# Patient Record
Sex: MALE | Race: BLACK OR AFRICAN AMERICAN | NOT HISPANIC OR LATINO | RURAL
[De-identification: names, ages, dates, MRNs, and addresses within clinical notes are randomized per-mention and may not be internally consistent; named-entity substitution may affect disease eponyms.]

---
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
## 2024-11-08 NOTE — ED PROVIDER NOTES
Encounter Date: 6/29/2023       History     Chief Complaint   Patient presents with    Nasal Congestion     Pt c/o nasal congestion for weeks, states he works at a [employer] and has had to swallow his mucus.     18-year-old male presents to the emergency department to be evaluated for nasal congestion that began a couple of weeks ago.  He works in a cold environment, began doing this work about a month ago and thinks that is the cause of his nasal congestion.  Denies any fever, chills, known sick contacts.    The history is provided by the patient.   URI  Primary symptoms do not include fever, fatigue, headaches, ear pain, sore throat, swollen glands, cough, wheezing, abdominal pain, nausea, vomiting, myalgias, arthralgias or rash.   Symptoms associated with the illness include congestion and rhinorrhea.   Review of patient's allergies indicates:  No Known Allergies  History reviewed. No pertinent past medical history.  History reviewed. No pertinent surgical history.  History reviewed. No pertinent family history.     Review of Systems   Constitutional:  Negative for fatigue and fever.   HENT:  Positive for congestion and rhinorrhea. Negative for ear pain and sore throat.    Respiratory:  Negative for cough and wheezing.    Gastrointestinal:  Negative for abdominal pain, nausea and vomiting.   Musculoskeletal:  Negative for arthralgias and myalgias.   Skin:  Negative for rash.   Neurological:  Negative for headaches.   All other systems reviewed and are negative.    Physical Exam     Initial Vitals [06/29/23 1150]   BP Pulse Resp Temp SpO2   116/70 (!) 56 16 98.7 °F (37.1 °C) 98 %      MAP       --         Physical Exam    Vitals reviewed.  Constitutional: He appears well-developed and well-nourished.   Neck: Neck supple.   Cardiovascular:  Normal rate and regular rhythm.           Pulmonary/Chest: Breath sounds normal.   Abdominal: Abdomen is soft. Bowel sounds are normal. He exhibits no distension and no  mass. There is no abdominal tenderness. There is no rebound and no guarding.   Musculoskeletal:         General: Normal range of motion.      Cervical back: Neck supple.     Neurological: He is alert and oriented to person, place, and time. He has normal strength. GCS score is 15. GCS eye subscore is 4. GCS verbal subscore is 5. GCS motor subscore is 6.   Skin: Skin is warm and dry. Capillary refill takes less than 2 seconds.   Psychiatric: He has a normal mood and affect.       Medical Screening Exam   See Full Note    ED Course   Procedures  Labs Reviewed - No data to display       Imaging Results    None          Medications - No data to display  Medical Decision Makin-year-old male presents to the emergency department to be evaluated for nasal congestion that began a couple of weeks ago.  He works in a cold environment, began doing this work about a month ago and thinks that is the cause of his nasal congestion.  Denies any fever, chills, known sick contacts.  Diagnosis: Allergic rhinitis  Prescribed Flonase and Claritin                       Clinical Impression:   Final diagnoses:  [J30.9] Allergic rhinitis, unspecified seasonality, unspecified trigger (Primary)        ED Disposition Condition    Discharge Stable          ED Prescriptions       Medication Sig Dispense Start Date End Date Auth. Provider    fluticasone propionate (FLONASE) 50 mcg/actuation nasal spray 1 spray (50 mcg total) by Each Nostril route 2 (two) times daily as needed. 15 g 2023 -- SARAH Greco    loratadine (CLARITIN) 10 mg tablet Take 1 tablet (10 mg total) by mouth once daily. 30 tablet 2023 SARAH Greco          Follow-up Information    None          SARAH Greco  23 1159     Kenalog Preparation: Kenalog How Many Mls Were Removed From The 40 Mg/Ml (5ml) Vial When Preparing The Injectable Solution?: 0 Ndc# For Kenalog Only: 5025-4524-25 Bill For Wasted Drug (Kenalog)?: no Detail Level: Detailed Total Volume (Ccs): 0.2 Medical Necessity Clause: This procedure was medically necessary because the lesions that were treated were: Validate Note Data When Using Inventory: Yes Kenalog Type Of Vial: Multiple Dose Consent: The risks of atrophy were reviewed with the patient. Administered By (Optional): TB Lot # For Kenalog (Optional): [id number] Concentration Of Kenalog Solution Injected (Mg/Ml): 5.0 Show Inventory Tab: Hide Expiration Date For Kenalog (Optional): 3-2026

## 2025-03-03 ENCOUNTER — HOSPITAL ENCOUNTER (EMERGENCY)
Facility: HOSPITAL | Age: 20
Discharge: HOME | End: 2025-03-04
Payer: MEDICAID

## 2025-03-03 DIAGNOSIS — N48.89 PENILE PAIN: Primary | ICD-10-CM

## 2025-03-03 LAB
BILIRUB UR QL STRIP: NEGATIVE
CLARITY UR: CLEAR
COLOR UR: YELLOW
GLUCOSE UR STRIP-MCNC: NORMAL MG/DL
KETONES UR STRIP-SCNC: 20 MG/DL
LEUKOCYTE ESTERASE UR QL STRIP: NEGATIVE
MUCOUS, UA: ABNORMAL /LPF
NITRITE UR QL STRIP: NEGATIVE
PH UR STRIP: 6.5 PH UNITS
PROT UR QL STRIP: 30
RBC # UR STRIP: NEGATIVE /UL
RBC #/AREA URNS HPF: 1 /HPF
SP GR UR STRIP: 1.04
SQUAMOUS #/AREA URNS LPF: ABNORMAL /HPF
UROBILINOGEN UR STRIP-ACNC: 6 MG/DL
WBC #/AREA URNS HPF: 1 /HPF

## 2025-03-03 PROCEDURE — 99282 EMERGENCY DEPT VISIT SF MDM: CPT

## 2025-03-03 PROCEDURE — 81003 URINALYSIS AUTO W/O SCOPE: CPT

## 2025-03-04 VITALS
RESPIRATION RATE: 15 BRPM | OXYGEN SATURATION: 98 % | TEMPERATURE: 98 F | SYSTOLIC BLOOD PRESSURE: 134 MMHG | HEART RATE: 84 BPM | HEIGHT: 74 IN | BODY MASS INDEX: 23.23 KG/M2 | DIASTOLIC BLOOD PRESSURE: 72 MMHG | WEIGHT: 181 LBS

## 2025-03-04 NOTE — ED PROVIDER NOTES
Encounter Date: 3/3/2025       History     Chief Complaint   Patient presents with    Penis Pain     Pt states that he came back to be STD checked because his brother wants him to be checked out. Pt has no drainage and pain has went away since his visit earlier today      RM is 20 y/o AAM    Patient has no significant past medical history     Patient presents to the emergency department after be evaluated earlier today for complaints of penis pain after intense intercourse for urinalysis.  Patient stated his brother wanted him to come back to be tested for STDs.  Patient informed STD testing does not happen to the emergency department with a Department of Health, but would check urine for gonorrhea and or Trichomonas.        Review of patient's allergies indicates:  No Known Allergies  History reviewed. No pertinent past medical history.  History reviewed. No pertinent surgical history.  No family history on file.  Social History[1]  Review of Systems   Constitutional: Negative.    HENT: Negative.     Eyes: Negative.    Respiratory: Negative.     Cardiovascular: Negative.    Gastrointestinal: Negative.    Endocrine: Negative.    Genitourinary:  Positive for penile pain.   Musculoskeletal: Negative.    Skin: Negative.    Allergic/Immunologic: Negative.    Neurological: Negative.    Hematological: Negative.    Psychiatric/Behavioral: Negative.         Physical Exam     Initial Vitals [03/03/25 2153]   BP Pulse Resp Temp SpO2   (!) 146/80 94 16 98.5 °F (36.9 °C) 99 %      MAP       --         Physical Exam    Nursing note and vitals reviewed.  Constitutional: Vital signs are normal. He appears well-developed and well-nourished. He is not diaphoretic. He is cooperative.  Non-toxic appearance. He does not have a sickly appearance. He does not appear ill. No distress.   Cardiovascular:  Normal rate, regular rhythm, S1 normal, S2 normal, normal heart sounds, intact distal pulses and normal pulses.            Pulmonary/Chest: Effort normal and breath sounds normal.   Genitourinary:    Testes and penis normal.       Neurological: He is alert and oriented to person, place, and time. He has normal strength and normal reflexes. He displays normal reflexes. No cranial nerve deficit or sensory deficit. He displays a negative Romberg sign. GCS eye subscore is 4. GCS verbal subscore is 5. GCS motor subscore is 6.   Skin: Skin is warm, dry and intact. Capillary refill takes less than 2 seconds. No rash noted.   Psychiatric: He has a normal mood and affect. His speech is normal and behavior is normal. Judgment and thought content normal. Cognition and memory are normal.         Medical Screening Exam   See Full Note    ED Course   Procedures  Labs Reviewed   URINALYSIS, REFLEX TO URINE CULTURE - Abnormal       Result Value    Color, UA Yellow      Clarity, UA Clear      pH, UA 6.5      Leukocytes, UA Negative      Nitrites, UA Negative      Protein, UA 30 (*)     Glucose, UA Normal      Ketones, UA 20 (*)     Urobilinogen, UA 6 (*)     Bilirubin, UA Negative      Blood, UA Negative      Specific Gravity, UA 1.039 (*)    URINALYSIS, MICROSCOPIC - Abnormal    WBC, UA 1      RBC, UA 1      Squamous Epithelial Cells, UA Occasional (*)     Mucous Many (*)           Imaging Results    None          Medications - No data to display  Medical Decision Making  RM is 20 y/o AAM    Patient has no significant past medical history     Patient presents to the emergency department after be evaluated earlier today for complaints of penis pain after intense intercourse for urinalysis.  Patient stated his brother wanted him to come back to be tested for STDs.  Patient informed STD testing does not happen to the emergency department with a Department of Health, but would check urine for gonorrhea and or Trichomonas.          Amount and/or Complexity of Data Reviewed  Labs:  Decision-making details documented in ED Course.  Discussion of management  or test interpretation with external provider(s): Penile pain   Possible STD               ED Course as of 03/03/25 2337   Mon Mar 03, 2025   2336 Lab results reviewed by me and discussed with patient [AC]      ED Course User Index  [AC] Jacob Valencia FNP                           Clinical Impression:   Final diagnoses:  [N48.89] Penile pain (Primary)        ED Disposition Condition    Discharge Stable          ED Prescriptions    None       Follow-up Information       Follow up With Specialties Details Why Contact Info    Local health department  In 1 day                 [1]         Jacob Valencia FNP  03/03/25 0194

## 2025-03-21 ENCOUNTER — HOSPITAL ENCOUNTER (EMERGENCY)
Facility: HOSPITAL | Age: 20
Discharge: HOME | End: 2025-03-21
Payer: MEDICAID

## 2025-03-21 VITALS
RESPIRATION RATE: 16 BRPM | HEART RATE: 91 BPM | WEIGHT: 182 LBS | OXYGEN SATURATION: 98 % | BODY MASS INDEX: 23.36 KG/M2 | TEMPERATURE: 98 F | DIASTOLIC BLOOD PRESSURE: 79 MMHG | SYSTOLIC BLOOD PRESSURE: 119 MMHG | HEIGHT: 74 IN

## 2025-03-21 DIAGNOSIS — Z01.89 ENCOUNTER FOR LABORATORY EXAMINATION: Primary | ICD-10-CM

## 2025-03-21 LAB
BILIRUB UR QL STRIP: NEGATIVE
CLARITY UR: CLEAR
COLOR UR: YELLOW
GLUCOSE UR STRIP-MCNC: NORMAL MG/DL
KETONES UR STRIP-SCNC: ABNORMAL MG/DL
LEUKOCYTE ESTERASE UR QL STRIP: NEGATIVE
NITRITE UR QL STRIP: NEGATIVE
PH UR STRIP: 6.5 PH UNITS
PROT UR QL STRIP: 10
RBC # UR STRIP: NEGATIVE /UL
SP GR UR STRIP: 1.03
UROBILINOGEN UR STRIP-ACNC: 8 MG/DL

## 2025-03-21 PROCEDURE — 81003 URINALYSIS AUTO W/O SCOPE: CPT

## 2025-03-21 PROCEDURE — 87591 N.GONORRHOEAE DNA AMP PROB: CPT | Performed by: NURSE PRACTITIONER

## 2025-03-21 PROCEDURE — 99282 EMERGENCY DEPT VISIT SF MDM: CPT

## 2025-03-21 NOTE — Clinical Note
"Jaya Portillo" Khushi was seen and treated in our emergency department on 3/21/2025.  He may return to work on 03/22/2025.       If you have any questions or concerns, please don't hesitate to call.      Narciso Smith, SOPHIE"

## 2025-03-22 NOTE — ED PROVIDER NOTES
Encounter Date: 3/21/2025       History     Chief Complaint   Patient presents with    Dysuria     Pt had uti associated with dysuria last week but sx are better now - wants repeat ua     19-year old male presents to the emergency department with no complaints, however states that he would like a repeat urinalysis to make sure that he has cleared UTI which he was diagnosed with and prescribed antibiotics for a week and a half ago. Denies fever, chills, nausea, vomiting, abdominal pain, back pain, dysuria.    The history is provided by the patient. No  was used.     Review of patient's allergies indicates:  No Known Allergies  No past medical history on file.  No past surgical history on file.  No family history on file.  Social History[1]  Review of Systems   All other systems reviewed and are negative.      Physical Exam     Initial Vitals [03/21/25 2031]   BP Pulse Resp Temp SpO2   119/79 91 16 98.3 °F (36.8 °C) 98 %      MAP       --         Physical Exam    Vitals reviewed.  Constitutional: He appears well-nourished. No distress.   HENT:   Head: Normocephalic.   Eyes: Conjunctivae are normal. Right eye exhibits no discharge. Left eye exhibits no discharge.   Neck: Neck supple.   Normal range of motion.  Cardiovascular:  Normal rate, regular rhythm and normal heart sounds.           Pulmonary/Chest: Breath sounds normal. No respiratory distress. He has no wheezes. He has no rhonchi.   Abdominal: Abdomen is soft. Bowel sounds are normal. He exhibits no distension. There is no abdominal tenderness. There is no guarding.   Musculoskeletal:         General: No tenderness. Normal range of motion.      Cervical back: Normal range of motion and neck supple.     Lymphadenopathy:     He has no cervical adenopathy.   Neurological: He is alert and oriented to person, place, and time. He has normal strength. No sensory deficit. GCS score is 15. GCS eye subscore is 4. GCS verbal subscore is 5. GCS motor  subscore is 6.   Skin: Skin is warm and dry. Capillary refill takes less than 2 seconds.   Psychiatric: He has a normal mood and affect. His behavior is normal.         Medical Screening Exam   See Full Note    ED Course   Procedures  Labs Reviewed   URINALYSIS, REFLEX TO URINE CULTURE - Abnormal       Result Value    Color, UA Yellow      Clarity, UA Clear      pH, UA 6.5      Leukocytes, UA Negative      Nitrites, UA Negative      Protein, UA 10 (*)     Glucose, UA Normal      Ketones, UA Trace      Urobilinogen, UA 8 (*)     Bilirubin, UA Negative      Blood, UA Negative      Specific Gravity, UA 1.031 (*)    CHLAMYDIA/GONORRHOEAE(GC), PCR          Imaging Results    None          Medications - No data to display  Medical Decision Making  19-year old male presents to the emergency department with no complaints, however states that he would like a repeat urinalysis to make sure that he has cleared UTI which he was diagnosed with and prescribed antibiotics for a week and a half ago. Denies fever, chills, nausea, vomiting, abdominal pain, back pain, dysuria.  Urinalysis, urine gc ordered  Diagnosis: Encounter for laboratory examination                                      Clinical Impression:   Final diagnoses:  [Z01.89] Encounter for laboratory examination (Primary)        ED Disposition Condition    Discharge Stable          ED Prescriptions    None       Follow-up Information    None            [1]         Narciso Smith NP  03/22/25 0753

## 2025-03-22 NOTE — DISCHARGE INSTRUCTIONS
Drink plenty of water. Follow up with primary care provider next week for re-evaluation. Return to the emergency department for new or worsening symptoms.